# Patient Record
Sex: MALE | Race: ASIAN | NOT HISPANIC OR LATINO | URBAN - METROPOLITAN AREA
[De-identification: names, ages, dates, MRNs, and addresses within clinical notes are randomized per-mention and may not be internally consistent; named-entity substitution may affect disease eponyms.]

---

## 2021-09-05 ENCOUNTER — EMERGENCY (EMERGENCY)
Facility: HOSPITAL | Age: 36
LOS: 1 days | Discharge: HOME | End: 2021-09-05
Attending: EMERGENCY MEDICINE | Admitting: EMERGENCY MEDICINE
Payer: OTHER MISCELLANEOUS

## 2021-09-05 VITALS
OXYGEN SATURATION: 100 % | TEMPERATURE: 98 F | WEIGHT: 151.9 LBS | DIASTOLIC BLOOD PRESSURE: 74 MMHG | SYSTOLIC BLOOD PRESSURE: 125 MMHG | HEART RATE: 78 BPM | RESPIRATION RATE: 16 BRPM
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
## 2021-09-05 DIAGNOSIS — M25.562 PAIN IN LEFT KNEE: ICD-10-CM
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
## 2021-09-05 DIAGNOSIS — M25.561 PAIN IN RIGHT KNEE: ICD-10-CM

## 2021-09-05 DIAGNOSIS — M79.641 PAIN IN RIGHT HAND: ICD-10-CM

## 2021-09-05 DIAGNOSIS — M79.642 PAIN IN LEFT HAND: ICD-10-CM

## 2021-09-05 DIAGNOSIS — M54.5 LOW BACK PAIN: ICD-10-CM

## 2021-09-05 PROCEDURE — 73590 X-RAY EXAM OF LOWER LEG: CPT

## 2021-09-05 PROCEDURE — 73562 X-RAY EXAM OF KNEE 3: CPT

## 2021-09-05 PROCEDURE — 99284 EMERGENCY DEPT VISIT MOD MDM: CPT

## 2021-09-05 PROCEDURE — 72100 X-RAY EXAM L-S SPINE 2/3 VWS: CPT

## 2021-09-05 PROCEDURE — 73562 X-RAY EXAM OF KNEE 3: CPT | Mod: 26,50

## 2021-09-05 PROCEDURE — 73130 X-RAY EXAM OF HAND: CPT | Mod: 26,50

## 2021-09-05 PROCEDURE — 73130 X-RAY EXAM OF HAND: CPT

## 2021-09-05 PROCEDURE — 99284 EMERGENCY DEPT VISIT MOD MDM: CPT | Mod: 25

## 2021-09-05 PROCEDURE — 73590 X-RAY EXAM OF LOWER LEG: CPT | Mod: 26,RT

## 2021-09-05 PROCEDURE — 72100 X-RAY EXAM L-S SPINE 2/3 VWS: CPT | Mod: 26

## 2021-09-05 RX ORDER — IBUPROFEN 200 MG
1 TABLET ORAL
Qty: 20 | Refills: 0
Start: 2021-09-05

## 2021-09-05 RX ORDER — IBUPROFEN 200 MG
600 TABLET ORAL ONCE
Refills: 0 | Status: COMPLETED | OUTPATIENT
Start: 2021-09-05 | End: 2021-09-05

## 2021-09-05 RX ADMIN — Medication 600 MILLIGRAM(S): at 23:26

## 2021-09-05 NOTE — ED PROVIDER NOTE - NSFOLLOWUPINSTRUCTIONS_ED_ALL_ED_FT
Take ibuprofen as directed for pain.    Use heat packs over areas of pain.    Follow up with your PMD.    Return to ED with worsening symptoms or other concerns.          Low Back Strain    WHAT YOU NEED TO KNOW:    Low back strain is an injury to your lower back muscles or tendons. Tendons are strong tissues that connect muscles to bones. The lower back supports most of your body weight and helps you move, twist, and bend.    DISCHARGE INSTRUCTIONS:    Seek care immediately if:   •You hear or feel a pop in your lower back.      •You have increased swelling or pain in your lower back.      •You have trouble moving your legs.      •Your legs are numb.      Contact your healthcare provider if:   •You have a fever.      •Your pain does not go away, even after treatment.      •You have questions or concerns about your condition or care.      Medicines: The following medicines may be ordered by your healthcare provider:   •Acetaminophen decreases pain and fever. It is available without a doctor's order. Ask how much to take and how often to take it. Follow directions. Acetaminophen can cause liver damage if not taken correctly.      •NSAIDs, such as ibuprofen, help decrease swelling, pain, and fever. This medicine is available with or without a doctor's order. NSAIDs can cause stomach bleeding or kidney problems in certain people. If you take blood thinner medicine, always ask your healthcare provider if NSAIDs are safe for you. Always read the medicine label and follow directions.      •Muscle relaxers help decrease pain and muscle spasms.      •Prescription pain medicine may be given. Ask how to take this medicine safely.      •Take your medicine as directed. Contact your healthcare provider if you think your medicine is not helping or if you have side effects. Tell him or her if you are allergic to any medicine. Keep a list of the medicines, vitamins, and herbs you take. Include the amounts, and when and why you take them. Bring the list or the pill bottles to follow-up visits. Carry your medicine list with you in case of an emergency.      Self-care:   •Rest as directed. You may need to rest in bed for a period of time after your injury. Do not lift heavy objects.       •Apply ice on your back for 15 to 20 minutes every hour or as directed. Use an ice pack, or put crushed ice in a plastic bag. Cover it with a towel. Ice helps prevent tissue damage and decreases swelling and pain.      •Apply heat on your lower back for 20 to 30 minutes every 2 hours for as many days as directed. Heat helps decrease pain and muscle spasms.      •Slowly start to increase your activity as the pain decreases, or as directed.      Prevent another low back strain:   •Use correct body movements. ?Bend at the hips and knees when you  objects. Do not bend from the waist. Use your leg muscles as you lift the load. Do not use your back. Keep the object close to your chest as you lift it. Try not to twist or lift anything above your waist.      ?Change your position often when you stand for long periods of time. Rest one foot on a small box or footrest, and then switch to the other foot often.      ?Try not to sit for long periods of time. When you do, sit in a straight-backed chair with your feet flat on the floor.      ?Never reach, pull, or push while you are sitting.      •Warm up before you exercise. Do exercises that strengthen your back muscles. Ask your healthcare provider about the best exercise plan for you.      •Maintain a healthy weight. Ask your healthcare provider how much you should weigh. Ask him to help you create a weight loss plan if you are overweight.       Follow up with your doctor as directed: Write down your questions so you remember to ask them during your visits.        © Copyright [employer] 2021           back to top                          © Copyright [employer] 2021

## 2021-09-05 NOTE — ED PROVIDER NOTE - PHYSICAL EXAMINATION
VITAL SIGNS: I have reviewed nursing notes and confirm.  CONSTITUTIONAL: Well-developed; well-nourished; in no acute distress.  SKIN: Agree with RN documentation regarding decubitus evaluation. Remainder of skin exam is warm and dry, no acute rash.  HEAD: Normocephalic; atraumatic.  EYES: PERRL, EOM intact; conjunctiva and sclera clear.  ENT: No nasal discharge; airway clear.  NECK: Supple; non tender.  CARD: S1, S2 normal; no murmurs, gallops, or rubs. Regular rate and rhythm.  RESP: No wheezes, rales or rhonchi.  ABD: Normal bowel sounds; soft; non-distended; non-tender; no hepatosplenomegaly.  MSK: Paralumbar tenderness b/l; no cervical spine tenderness; no midline spine tenderness; neurovascularly intact; R knee tenderness, R lateral lower leg bony tenderness with no swelling or obvious deformity; able to flex and range R knee; L knee tenderness over the patella; not able to range L knee; no ankle or hip tenderness; DT/TP pulses intact, hand tenderness to the third metatarsal area and PPI joint b/l, able to make a fist, able to extend fingers; radial pulse intact b/l; sensation intact.   EXT: Normal ROM. No clubbing, cyanosis or edema.  LYMPH: No acute cervical adenopathy.  NEURO: Alert & Oriented x 3. CN II-XII intact. No facial droop. Clear speech. KAY w/ 5/5 strength x 4 ext. Normal sensation. No pronator drift. No dysdidokinesia nor dysmetria. Normal heel-to-shin.   PSYCH: Cooperative, appropriate. VITAL SIGNS: I have reviewed nursing notes and confirm.  CONSTITUTIONAL: Well-developed; well-nourished; in no acute distress.  SKIN: Agree with RN documentation regarding decubitus evaluation. Remainder of skin exam is warm and dry, no acute rash.  HEAD: Normocephalic; atraumatic.  EYES: PERRL, EOM intact; conjunctiva and sclera clear.  ENT: No nasal discharge; airway clear.  NECK: Supple; non tender.  CARD: S1, S2 normal; no murmurs, gallops, or rubs. Regular rate and rhythm.  RESP: No wheezes, rales or rhonchi.  ABD: Normal bowel sounds; soft; non-distended; non-tender; no hepatosplenomegaly.  MSK: Paralumbar tenderness b/l; no cervical spine tenderness; no midline spine tenderness; neurovascularly intact; R knee tenderness, R lateral lower leg bony tenderness with no swelling or obvious deformity; able to flex and range R knee; L knee tenderness over the patella; not able to range L knee; no ankle or hip tenderness; DT/TP pulses intact, hand tenderness to the third metatarsal area and PPI joint b/l, able to make a fist, able to extend fingers; radial pulse intact b/l; sensation intact.   EXT: No clubbing, cyanosis or edema.  LYMPH: No acute cervical adenopathy.  NEURO: Alert & Oriented x 3. CN II-XII intact. No facial droop. Clear speech. KAY w/ 5/5 strength x 4 ext. Normal sensation. No pronator drift. No dysdidokinesia nor dysmetria. Normal heel-to-shin.   PSYCH: Cooperative, appropriate.
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
## 2021-09-05 NOTE — ED PROVIDER NOTE - PATIENT PORTAL LINK FT
You can access the FollowMyHealth Patient Portal offered by [facility] by registering at the following website: [email or web address]. By joining [facility]’s FollowMyHealth portal, you will also be able to view your health information using other applications (apps) compatible with our system.
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
## 2025-01-01 NOTE — ED ADULT NURSE NOTE - ALCOHOL PRE SCREEN (AUDIT - C)
"             [facility] Medicine Services  Discharge Summary    Date of Service: 1/3/2025  Patient Name: Kali Garcia  : 1943  MRN: [id number]    Date of Admission: 2024  Discharge Diagnosis: Pneumonia  Date of Discharge: 1/3/2025  Primary Care Physician: Tami Rasmussen APRN      Presenting Problem:   Dehydration [E86.0]  Rhinovirus [B34.8]  Acute on chronic respiratory failure with hypoxia [J96.21]  Pneumonia of left lower lobe due to infectious organism [J18.9]  Anemia, unspecified type [D64.9]  Pneumonia [J18.9]  Acute on chronic respiratory failure with hypoxia and hypercapnia [J96.21, J96.22]    Active and Resolved Hospital Problems:  Active Hospital Problems    Diagnosis POA    **Pneumonia [J18.9] Yes    Pneumonia, unspecified organism [J18.9] Yes    Rhinovirus [B34.8] Yes    Anemia [D64.9] Yes    Acute on chronic respiratory failure with hypoxia [J96.21] Yes    Coronary artery disease [I25.10] Yes    Hypertension [I10] Yes    COPD (chronic obstructive pulmonary disease) [J44.9] Yes    Severe malnutrition [E43] Yes      Resolved Hospital Problems   No resolved problems to display.         Hospital Course     HPI:    \"Kali Garcia is a 81 y.o. male with PMH of COPD/chronic respiratory failure on 6L O2, atrial fibrillation, CAD, PVD, hypertension, hyperlipidemia, OA presented to [facility] ED 2024 with complaints of shortness of breath, cough, congestion for two weeks. He has had some intermittent right sided chest soreness. He has felt weak.      In the ED the patient had normal WBC, lactate normal 1.4, procalcitonin 0.60, hgb 7.8 (compared to 10.2024). CXR showed increased opacity in the left lung base could reflect pneumonia.  Stable nodular thickening in the right lung apex.  Severe emphysema chronic interstitial disease.  Respiratory viral panel detected rhinovirus. He was started on IV unasyn and zithromax. He was admitted for further treatment of acute on chronic " "respiratory failure with hypoxia, pneumonia, rhinovirus, and anemia.\"    Hospital Course:  Acute on chronic hypoxemic hypercapnia respiratory  failure   Lower lobe pneumonia   COPD with acute exacerbation   Rhinovirus infection   -Conservative management for rhinovirus  -Completed course of Unasyn while in the hospital  -Was being followed by pulmonology in the hospital  -Will send in a short prednisone taper on discharge  -Continue supplemental oxygen to maintain O2 sat > 90     CAD   Atrial fibrillation   PAD   -Converted to sinus rhythm and rate controlled  -Continue home plavix and aspirin  Will continue patient on amiodarone as ordered per cardiology on discharge and decrease metoprolol dose to 12.5 mg daily  -Telemonitoring  TSH WNL  Cardiology consulted, was also evaluated by EP who stated not an ideal candidate for ablation        SSS  Cardiology following, Patient underwent pacemaker placement as he was seen to have frequent sinus pauses on telemetry     Urinary retention   Hematuria   -No blood noted in catheter   -Continue springer catheter  -Patient wishes to discharge with springer and hospice following, no voiding trial required      Hypernatremia, resolved        DISCHARGE Follow Up Recommendations for labs and diagnostics: Follow-up with PCP in 1 week         Day of Discharge     Vital Signs:  Temp:  [97.5 °F (36.4 °C)-98.4 °F (36.9 °C)] 97.9 °F (36.6 °C)  Heart Rate:  [52-63] 63  Resp:  [16-27] 18  BP: (116-147)/(48-82) 122/48  Flow (L/min) (Oxygen Therapy):  [5-6] 6    Physical Exam:  Physical Exam  Constitutional:       Comments: NAD    Cardiovascular:      Comments:  RRR, S1 & S2   Pulmonary:      Comments:  Lungs CTA   Abdominal:      Comments:  ABD soft, NT             Pertinent  and/or Most Recent Results     LAB RESULTS:      Lab 01/01/25  0110 12/31/24  0031 12/30/24  0050 12/29/24  0353 12/28/24  0015   WBC 4.24 7.54 4.16 3.57 4.32   HEMOGLOBIN 8.3* 9.2* 8.9* 8.4* 8.3*   HEMATOCRIT 26.1* 28.4* " 27.7* 26.8* 26.6*   PLATELETS 137* 158 152 145 137*   NEUTROS ABS 3.46 6.21 3.57 2.91 3.45   IMMATURE GRANS (ABS) 0.34* 0.57* 0.13* 0.20* 0.40*   LYMPHS ABS 0.22* 0.18* 0.22* 0.31* 0.26*   MONOS ABS 0.22 0.57 0.23 0.15 0.21   EOS ABS 0.00 0.00 0.00 0.00 0.00   MCV 94.6 94.0 93.9 93.7 96.4         Lab 01/01/25  0110 12/31/24  0031 12/30/24  0050 12/29/24  1334 12/29/24  0353 12/28/24  0604   SODIUM 141 139 139  --  137 140   POTASSIUM 4.5 5.0 4.9  --  4.8 4.4   CHLORIDE 105 103 102  --  101 103   CO2 32.1* 31.0* 32.6*  --  32.3* 31.9*   ANION GAP 3.9* 5.0 4.4*  --  3.7* 5.1   BUN 30* 33* 28*  --  22 26*   CREATININE 0.72* 1.03 1.25  --  0.99 0.77   EGFR 92.4 73.4 58.2*  --  77.0 90.5   GLUCOSE 115* 134* 131*  --  103* 92   CALCIUM 8.3* 8.6 8.7  --  8.6 8.5*   TSH  --   --   --  3.470  --   --                          Brief Urine Lab Results       None          Microbiology Results (last 10 days)       Procedure Component Value - Date/Time    Respiratory Panel PCR w/COVID-19(SARS-CoV-2) JOSEFINA/MERE/FAROOQ/PAD/COR/VANDA In-House, NP Swab in UTM/VTM, 2 HR TAT - Swab, Nasopharynx [[id number]]  (Abnormal) Collected: 12/22/24 2204    Lab Status: Final result Specimen: Swab from Nasopharynx Updated: 12/22/24 2320     ADENOVIRUS, PCR Not Detected     Coronavirus 229E Not Detected     Coronavirus HKU1 Not Detected     Coronavirus NL63 Not Detected     Coronavirus OC43 Not Detected     COVID19 Not Detected     Human Metapneumovirus Not Detected     Human Rhinovirus/Enterovirus Detected     Influenza A PCR Not Detected     Influenza B PCR Not Detected     Parainfluenza Virus 1 Not Detected     Parainfluenza Virus 2 Not Detected     Parainfluenza Virus 3 Not Detected     Parainfluenza Virus 4 Not Detected     RSV, PCR Not Detected     Bordetella pertussis pcr Not Detected     Bordetella parapertussis PCR Not Detected     Chlamydophila pneumoniae PCR Not Detected     Mycoplasma pneumo by PCR Not Detected    Narrative:      In the setting  of a positive respiratory panel with a viral infection PLUS a negative procalcitonin without other underlying concern for bacterial infection, consider observing off antibiotics or discontinuation of antibiotics and continue supportive care. If the respiratory panel is positive for atypical bacterial infection (Bordetella pertussis, Chlamydophila pneumoniae, or Mycoplasma pneumoniae), consider antibiotic de-escalation to target atypical bacterial infection.    Blood Culture - Blood, Arm, Right [[id number]]  (Normal) Collected: 12/22/24 2203    Lab Status: Final result Specimen: Blood from Arm, Right Updated: 12/27/24 2215     Blood Culture No growth at 5 days    Blood Culture - Blood, Arm, Left [[id number]]  (Abnormal) Collected: 12/22/24 2203    Lab Status: Final result Specimen: Blood from Arm, Left Updated: 12/25/24 0622     Blood Culture Staphylococcus, coagulase negative     Isolated from Aerobic Bottle     Gram Stain Aerobic Bottle Gram positive cocci in clusters    Narrative:      Probable contaminant requires clinical correlation, susceptibility not performed unless requested by physician.      Blood Culture ID, PCR - Blood, Arm, Left [[id number]]  (Abnormal) Collected: 12/22/24 2203    Lab Status: Final result Specimen: Blood from Arm, Left Updated: 12/24/24 0055     BCID, PCR Staph spp, not aureus or lugdunensis. Identification by BCID2 PCR.     BOTTLE TYPE Aerobic Bottle            XR Chest 1 View    Result Date: 12/22/2024  Impression: Impression: 1.Increased opacity in the left lung base could reflect pneumonia. 2.Stable nodular thickening in the right lung apex. 3.Severe emphysema and chronic interstitial disease. Electronically Signed: Sandro Saravia MD  12/22/2024 10:57 PM EST  Workstation ID: [id number]     Results for orders placed during the hospital encounter of 02/01/24    Bilateral Carotid Duplex    Interpretation Summary    Minimal atherosclerotic plaque proximal right internal carotid artery with  less than 50% ICA stenosis.    Mild atherosclerotic plaque proximal left internal carotid artery with less than 50% ICA stenosis.      Results for orders placed during the hospital encounter of 02/01/24    Bilateral Carotid Duplex    Interpretation Summary    Minimal atherosclerotic plaque proximal right internal carotid artery with less than 50% ICA stenosis.    Mild atherosclerotic plaque proximal left internal carotid artery with less than 50% ICA stenosis.      Results for orders placed during the hospital encounter of 02/01/24    Adult Transthoracic Echo Limited W/ Cont if Necessary Per Protocol    Interpretation Summary    Left ventricular systolic function is normal. Left ventricular ejection fraction appears to be 61 - 65%.    Left ventricular diastolic function was normal.    The left atrial cavity is mildly dilated.    Left atrial volume is mildly increased.    There is moderate calcification of the aortic valve mainly affecting the left coronary and right coronary cusp(s).    Estimated right ventricular systolic pressure from tricuspid regurgitation is mildly elevated (35-45 mmHg).    Mild pulmonary hypertension is present.      Labs Pending at Discharge:  Pending Results       Procedure [Order ID] Specimen - Date/Time    Basic Metabolic Panel [[id number]]     Specimen: Blood     Occult Blood, Fecal By Immunoassay - Stool, Per Rectum [[id number]]     Specimen: Stool from Per Rectum     Occult Blood, Fecal By Immunoassay - Stool, Per Rectum [[id number]]     Specimen: Stool from Per Rectum             Procedures Performed           Consults:   Consults       Date and Time Order Name Status Description    12/30/2024  1:14 PM Inpatient Cardiac Electrophysiology Consult      12/30/2024 11:22 AM Inpatient Cardiology Consult Completed     12/23/2024  2:10 AM Inpatient Pulmonology Consult Completed     12/22/2024 11:25 PM Hospitalist (on-call MD unless specified)                Discharge Details        Discharge  Medications        New Medications        Instructions Start Date   arformoterol 15 MCG/2ML nebulizer solution  Commonly known as: BROVANA   15 mcg, Nebulization, 2 Times Daily - RT      budesonide 0.5 MG/2ML nebulizer solution  Commonly known as: PULMICORT   0.5 mg, Nebulization, 2 Times Daily - RT      predniSONE 20 MG tablet  Commonly known as: DELTASONE   Take 1 tablet by mouth Daily for 5 days, THEN 0.5 tablets Daily for 5 days.   Start Date: January 1, 2025     tamsulosin 0.4 MG capsule 24 hr capsule  Commonly known as: FLOMAX   0.4 mg, Oral, Daily      theophylline 300 MG 12 hr tablet  Commonly known as: THEODUR   300 mg, Oral, Daily             Changes to Medications        Instructions Start Date   amiodarone 200 MG tablet  Commonly known as: PACERONE  What changed: when to take this   200 mg, Oral, 2 Times Daily             Continue These Medications        Instructions Start Date   clopidogrel 75 MG tablet  Commonly known as: PLAVIX   1 tablet, Daily      ipratropium-albuterol 0.5-2.5 mg/3 ml nebulizer  Commonly known as: DUO-NEB   3 mL, Every 4 Hours PRN      polyethylene glycol 17 g packet  Commonly known as: MIRALAX   17 g, Daily      pravastatin 40 MG tablet  Commonly known as: PRAVACHOL   1 tablet, Daily      traZODone 100 MG tablet  Commonly known as: DESYREL   100 mg, Oral, Daily PRN             Stop These Medications      amLODIPine 10 MG tablet  Commonly known as: NORVASC     Trelegy Ellipta 200-62.5-25 MCG/ACT inhaler  Generic drug: Fluticasone-Umeclidin-Vilant              Allergies   Allergen Reactions    Codeine GI Intolerance         Discharge Disposition:    Skilled Nursing Facility (DC - External)    Diet:  Hospital:  Diet Order   Procedures    Diet: Regular/House; Fluid Consistency: Thin (IDDSI 0)         Discharge Activity:         CODE STATUS:  Code Status and Medical Interventions: No CPR (Do Not Attempt to Resuscitate); Limited Support; No intubation (DNI), No cardioversion   Ordered  at: 12/24/24 1547     Medical Intervention Limits:    No intubation (DNI)    No cardioversion     Level Of Support Discussed With:    Next of Kin (If No Surrogate)     Code Status (Patient has no pulse and is not breathing):    No CPR (Do Not Attempt to Resuscitate)     Medical Interventions (Patient has pulse or is breathing):    Limited Support         No future appointments.        Time spent on Discharge including face to face service:  35 minutes    Signature: Electronically signed by Sho Capps MD, 01/01/25, 08:27 [facility].  [facility] Hospitalist Team     Statement Selected